# Patient Record
Sex: FEMALE | Race: WHITE | NOT HISPANIC OR LATINO | URBAN - METROPOLITAN AREA
[De-identification: names, ages, dates, MRNs, and addresses within clinical notes are randomized per-mention and may not be internally consistent; named-entity substitution may affect disease eponyms.]

---

## 2018-01-14 ENCOUNTER — HISTORICAL (OUTPATIENT)
Dept: ADMINISTRATIVE | Facility: HOSPITAL | Age: 58
End: 2018-01-14

## 2018-01-18 ENCOUNTER — HISTORICAL (OUTPATIENT)
Dept: LAB | Facility: HOSPITAL | Age: 58
End: 2018-01-18

## 2018-01-18 LAB
APTT PPP: 25.8 SECOND(S) (ref 24.8–36.9)
INR PPP: 0.93 (ref 0–1.27)
PROTHROMBIN TIME: 12.8 SECOND(S) (ref 12.2–14.7)

## 2018-01-20 LAB
FINAL CULTURE: NORMAL
FINAL CULTURE: NORMAL

## 2018-02-11 ENCOUNTER — HISTORICAL (OUTPATIENT)
Dept: ADMINISTRATIVE | Facility: HOSPITAL | Age: 58
End: 2018-02-11

## 2018-02-13 LAB — FINAL CULTURE: NORMAL

## 2018-02-17 LAB
FINAL CULTURE: NORMAL
FINAL CULTURE: NORMAL

## 2019-09-26 ENCOUNTER — HISTORICAL (OUTPATIENT)
Dept: ADMINISTRATIVE | Facility: HOSPITAL | Age: 59
End: 2019-09-26

## 2019-09-28 LAB — FINAL CULTURE: NORMAL

## 2022-04-10 ENCOUNTER — HISTORICAL (OUTPATIENT)
Dept: ADMINISTRATIVE | Facility: HOSPITAL | Age: 62
End: 2022-04-10

## 2022-04-29 VITALS
BODY MASS INDEX: 29.06 KG/M2 | SYSTOLIC BLOOD PRESSURE: 124 MMHG | WEIGHT: 170.19 LBS | DIASTOLIC BLOOD PRESSURE: 82 MMHG | HEIGHT: 64 IN | OXYGEN SATURATION: 98 %

## 2022-09-07 DIAGNOSIS — N64.4 BREAST PAIN, RIGHT: ICD-10-CM

## 2022-09-07 DIAGNOSIS — R92.8 ABNORMAL MAMMOGRAM: ICD-10-CM

## 2022-09-07 DIAGNOSIS — N89.8 VAGINAL LESION: Primary | ICD-10-CM

## 2022-12-12 ENCOUNTER — OFFICE VISIT (OUTPATIENT)
Dept: GYNECOLOGY | Facility: CLINIC | Age: 62
End: 2022-12-12
Payer: MEDICAID

## 2022-12-12 VITALS
BODY MASS INDEX: 31.65 KG/M2 | SYSTOLIC BLOOD PRESSURE: 108 MMHG | HEIGHT: 63 IN | DIASTOLIC BLOOD PRESSURE: 73 MMHG | TEMPERATURE: 98 F | OXYGEN SATURATION: 100 % | WEIGHT: 178.63 LBS | HEART RATE: 67 BPM | RESPIRATION RATE: 18 BRPM

## 2022-12-12 DIAGNOSIS — N89.8 VAGINAL LESION: ICD-10-CM

## 2022-12-12 DIAGNOSIS — N64.4 BREAST PAIN, RIGHT: ICD-10-CM

## 2022-12-12 DIAGNOSIS — R92.8 ABNORMAL MAMMOGRAM: ICD-10-CM

## 2022-12-12 DIAGNOSIS — N89.8 VAGINAL MASS: Primary | ICD-10-CM

## 2022-12-12 DIAGNOSIS — R30.0 DYSURIA: ICD-10-CM

## 2022-12-12 DIAGNOSIS — Z12.4 SCREENING FOR CERVICAL CANCER: ICD-10-CM

## 2022-12-12 DIAGNOSIS — B37.31 VULVAR CANDIDIASIS: ICD-10-CM

## 2022-12-12 LAB
APPEARANCE UR: CLEAR
BACTERIA #/AREA URNS AUTO: ABNORMAL /HPF
BILIRUB UR QL STRIP.AUTO: NEGATIVE MG/DL
COLOR UR AUTO: ABNORMAL
GLUCOSE UR QL STRIP.AUTO: NORMAL MG/DL
HYALINE CASTS #/AREA URNS LPF: ABNORMAL /LPF
KETONES UR QL STRIP.AUTO: NEGATIVE MG/DL
LEUKOCYTE ESTERASE UR QL STRIP.AUTO: NEGATIVE UNIT/L
NITRITE UR QL STRIP.AUTO: NEGATIVE
PH UR STRIP.AUTO: 5 [PH]
PROT UR QL STRIP.AUTO: NEGATIVE MG/DL
RBC #/AREA URNS AUTO: ABNORMAL /HPF
RBC UR QL AUTO: ABNORMAL UNIT/L
SP GR UR STRIP.AUTO: 1.02
SQUAMOUS #/AREA URNS LPF: ABNORMAL /HPF
UROBILINOGEN UR STRIP-ACNC: NORMAL MG/DL
WBC #/AREA URNS AUTO: ABNORMAL /HPF

## 2022-12-12 PROCEDURE — 87591 N.GONORRHOEAE DNA AMP PROB: CPT

## 2022-12-12 PROCEDURE — 87624 HPV HI-RISK TYP POOLED RSLT: CPT

## 2022-12-12 PROCEDURE — 87661 TRICHOMONAS VAGINALIS AMPLIF: CPT

## 2022-12-12 PROCEDURE — 87088 URINE BACTERIA CULTURE: CPT

## 2022-12-12 PROCEDURE — 57100 BIOPSY VAGINAL MUCOSA SIMPLE: CPT | Mod: PBBFAC | Performed by: OBSTETRICS & GYNECOLOGY

## 2022-12-12 PROCEDURE — 87491 CHLMYD TRACH DNA AMP PROBE: CPT

## 2022-12-12 PROCEDURE — 88305 TISSUE EXAM BY PATHOLOGIST: CPT

## 2022-12-12 PROCEDURE — 81001 URINALYSIS AUTO W/SCOPE: CPT

## 2022-12-12 PROCEDURE — 87480 CANDIDA DNA DIR PROBE: CPT

## 2022-12-12 PROCEDURE — 99215 OFFICE O/P EST HI 40 MIN: CPT | Mod: PBBFAC,25

## 2022-12-12 RX ORDER — NYSTATIN AND TRIAMCINOLONE ACETONIDE 100000; 1 [USP'U]/G; MG/G
CREAM TOPICAL
COMMUNITY
Start: 2022-09-22

## 2022-12-12 RX ORDER — HYDROCODONE BITARTRATE AND ACETAMINOPHEN 5; 325 MG/1; MG/1
1 TABLET ORAL
COMMUNITY
Start: 2022-06-28

## 2022-12-12 RX ORDER — ALBUTEROL SULFATE 90 UG/1
2 AEROSOL, METERED RESPIRATORY (INHALATION) EVERY 6 HOURS
COMMUNITY
Start: 2022-09-21

## 2022-12-12 RX ORDER — CYCLOSPORINE 0.5 MG/ML
1 EMULSION OPHTHALMIC 2 TIMES DAILY
COMMUNITY
Start: 2022-10-24

## 2022-12-12 RX ORDER — FLUCONAZOLE 150 MG/1
150 TABLET ORAL
Qty: 3 TABLET | Refills: 1 | Status: SHIPPED | OUTPATIENT
Start: 2022-12-12 | End: 2022-12-17

## 2022-12-12 RX ORDER — FLUTICASONE PROPIONATE 50 MCG
1 SPRAY, SUSPENSION (ML) NASAL
COMMUNITY
Start: 2022-09-21

## 2022-12-12 RX ORDER — BUDESONIDE AND FORMOTEROL FUMARATE DIHYDRATE 160; 4.5 UG/1; UG/1
AEROSOL RESPIRATORY (INHALATION)
COMMUNITY
Start: 2022-09-21

## 2022-12-12 RX ORDER — ONDANSETRON 8 MG/1
TABLET, ORALLY DISINTEGRATING ORAL
COMMUNITY
Start: 2022-06-29

## 2022-12-12 RX ORDER — FUROSEMIDE 20 MG/1
20 TABLET ORAL DAILY PRN
COMMUNITY
Start: 2022-07-25

## 2022-12-12 RX ORDER — MUPIROCIN 20 MG/G
OINTMENT TOPICAL
COMMUNITY
Start: 2022-06-29

## 2022-12-12 RX ORDER — ATORVASTATIN CALCIUM 20 MG/1
20 TABLET, FILM COATED ORAL DAILY
COMMUNITY
Start: 2022-07-27

## 2022-12-12 RX ORDER — OMEPRAZOLE 40 MG/1
1 CAPSULE, DELAYED RELEASE ORAL DAILY
COMMUNITY
Start: 2022-06-29

## 2022-12-12 NOTE — PROGRESS NOTES
"[facility] NOTE  [facility]  [address] [place] [address]  Phone: [phone]  Fax: [phone]    Subjective:     Renay Kelly is a 62 y.o.  who presents complaining of 8 month h/o vaginal lesion vs mass that "hangs" with associated pain and pruritis. Occasionally noted vaginal spotting. Of note, pt is s/p hysterectomy in 2015 following abdominal trauma.     Reports 2-3 month h/o malodorous urine and dysuria. Increased urinary frequency. Has seen a Urologist in [place], [place] who prescribed her a trial of Oxybutynin 5mg. Pt took this regimen for ~1 month before stopping due to minimal response.     Of note, pt reports h/o right breast masses that are being followed by her PCP (Dr. Ponce in [place], [place]). Requires MMG & US q6 months per pt.     Allergies: Latex (Rubber)  OBHx: SVDx1, C/S x2  GynHx:   S/p hyst in    Reports h/o abnormal paps prior to hyst. No paps since hyst  Denies Hx of STIs     MedHx:   Past Medical History:   Diagnosis Date    Hypertension      SurgHx:   Past Surgical History:   Procedure Laterality Date    BACK SURGERY       SECTION      GALLBLADDER SURGERY      HYSTERECTOMY      left elbow surgery      left shoulder surgery      left wrist surgery      lefy knee surgery      removal of tumors      tonsils surgery         Medications:     Current Outpatient Medications:     albuterol (PROVENTIL/VENTOLIN HFA) 90 mcg/actuation inhaler, Inhale 2 puffs into the lungs every 6 (six) hours., Disp: , Rfl:     atorvastatin (LIPITOR) 20 MG tablet, Take 20 mg by mouth once daily., Disp: , Rfl:     budesonide-formoterol 160-4.5 mcg (SYMBICORT) 160-4.5 mcg/actuation HFAA, USE 2 PUFFS BY MOUTH TWICE A DAY, Disp: , Rfl:     fluticasone propionate (FLONASE) 50 mcg/actuation nasal spray, 1 spray., Disp: , Rfl:     furosemide (LASIX) 20 MG tablet, Take 20 mg by mouth daily as needed., Disp: , Rfl:     HYDROcodone-acetaminophen (NORCO) 5-325 mg per tablet, Take 1 tablet by mouth., " "Disp: , Rfl:     mupirocin (BACTROBAN) 2 % ointment, APPLY TO AFFECTED AREA THREE TIMES A DAY FOR 3 DAYS, Disp: , Rfl:     nystatin-triamcinolone (MYCOLOG II) cream, APPLY A DIME-SIZED AMOUNT TO VAGINA EVERY DAY TO VAGINA AS DIRECTED, Disp: , Rfl:     omeprazole (PRILOSEC) 40 MG capsule, Take 1 capsule by mouth once daily., Disp: , Rfl:     ondansetron (ZOFRAN-ODT) 8 MG TbDL, DISSOLVE ONE TABLET IN MOUTH EVERY 8 HOURS FOR 3 DAYS, Disp: , Rfl:     fluconazole (DIFLUCAN) 150 MG Tab, Take 1 tablet (150 mg total) by mouth every 48 hours. for 3 doses, Disp: 3 tablet, Rfl: 1    RESTASIS 0.05 % ophthalmic emulsion, Place 1 drop into both eyes 2 (two) times daily., Disp: , Rfl:     FM Hx:   Family History   Problem Relation Age of Onset    Stroke Father     Hypertension Mother        Social Hx:   Social History     Socioeconomic History    Marital status:    Tobacco Use    Smoking status: Never     Passive exposure: Never    Smokeless tobacco: Never   Substance and Sexual Activity    Alcohol use: Yes     Comment: occasionally    Drug use: Never    Sexual activity: Not Currently       Review of Systems  Denies fevers, chills, headache, blurry vision, nausea, vomiting, dizziness, or syncope.   Denies chest pain, shortness of breath, RUQ pain, or calf pain.    Objective:     Vitals:    12/12/22 1009   BP: 108/73   BP Location: Left arm   Patient Position: Sitting   BP Method: Small (Automatic)   Pulse: 67   Resp: 18   Temp: 97.7 °F (36.5 °C)   TempSrc: Oral   SpO2: 100%   Weight: 81 kg (178 lb 9.6 oz)   Height: 5' 3" (1.6 m)     Body mass index is 31.64 kg/m².    Physical Exam:     General: alert and oriented, in no acute distress  Lungs: clear to auscultation bilaterally, no conversational dyspnea  Heart: regular rate and rhythm  Abdomen: Soft, non-distended, non tender to palpation, no involuntary guarding, no rebound tenderness  Extremities: Normal, atraumatic, non-edematous, No cords or calf tenderness, No " significant calf/ankle edema  External genitalia: Erythematous fungal infection noted on mons pubis and bilateral intertriginous area. Otherwise female genitalia without lesion, discharge or tenderness. Normal appearing urethral meatus. Normal appearing external anus.  Bimanual Exam: No pelvic lymphadenopathy noted bilaterally. ~1cm pedunculated pale, soft, wart-like vaginal mass noted at 12'o clock on the anterior vaginal wall. <1cm stalk/base, slightly firm. Non-friable. Mass painful to touch. >1cm from urethral opening. No adnexal fullness/tenderness. Normal urethra. Normal bladder. Vaginal cuff intact.  Speculum Exam: Atrophic, pale vagina. Vaginal cuff intact. See image below for mass.        Note: RN chaperone present for entirety of exam.    Procedure Note:  Verbal and written consents were completed. A time out was called. The site of excision was visualized and cleaned with Betadine x3. 10mL of buffered Lidocaine injected. Specimen grasped and sharply excised. Specimen sent to pathology. Site of incision was hemostatic following application of silver nitrate. Pt tolerated the procedure well.    Assessment/Plan:    Renay Kelly is a 62 y.o.  with vaginal mass and vulvar candidiasis     Vaginal Mass  S/p excision of 1-2cm mass. Sent to pathology  Vulvar candidiasis  Noted on exam  Will treat with Diflucan 150mg q48hrs x 3 doses  Vaginal Atrophy  Will consider initiation of Premarin following review of vaginal mass pathology. If not a malignancy, can initiate regimen  H/o cervical dysplasia  S/p hyst with reported h/o abnormal paps and no paps following hyst in   Pap completed today  Urinary frequency w/ dysuria  UA & UCx collectedUrge Incontinence  Encouraged to continue medical regimen for at least 3 months  Follows w/ Urologist in [facility] f/u in 2-3 weeks for results. Will call pt earlier if concern for malignancy.   Future Appointments   Date Time Provider Department Center    1/13/2023  7:45 AM [facility]     Patient and plan were discussed with Dr. Sweeney.    Rashad Drummond MD, MPH  [facility] -  PGY4  Pager: [phone]  10:30 AM 12/12/2022

## 2022-12-13 LAB
ESTROGEN SERPL-MCNC: NORMAL PG/ML
INSULIN SERPL-ACNC: NORMAL U[IU]/ML
LAB AP CLINICAL INFORMATION: NORMAL
LAB AP GROSS DESCRIPTION: NORMAL
LAB AP REPORT FOOTNOTES: NORMAL
T3RU NFR SERPL: NORMAL %

## 2022-12-14 LAB — BACTERIA UR CULT: NO GROWTH

## 2022-12-20 LAB
CANDIDA SPECIES (PRECISION): NEGATIVE
CHLAMYDIA TRACHOMATIS (PRECISION): NEGATIVE
HPV16+18+H RISK 12 DNA CVX-IMP: NEGATIVE
INSULIN SERPL-ACNC: NORMAL U[IU]/ML
LAB AP BETHESDA CATEGORY: NORMAL
LAB AP CLINICAL FINDINGS: NORMAL
LAB AP CONTRACEPTIVES: NORMAL
LAB AP GYN MOLECULAR TESTING: NORMAL
LAB AP LMP DATE: NORMAL
LAB AP OCHS PAP SPECIMEN ADEQUACY: NORMAL
LAB AP OHS PAP INTERPRETATION: NORMAL
LAB AP PAP DISCLAIMER COMMENTS: NORMAL
LAB AP PAP PREVIOUS BX: NORMAL
LAB AP PAP PRIOR TREATMENT: NORMAL
NEISSERIA GONORRHOEAE (PRECISION): NEGATIVE
TRICHOMONAS VAGINALIS (PRECISION): NEGATIVE

## 2023-01-13 ENCOUNTER — CLINICAL SUPPORT (OUTPATIENT)
Dept: GYNECOLOGY | Facility: CLINIC | Age: 63
End: 2023-01-13
Payer: MEDICAID

## 2023-01-13 DIAGNOSIS — A63.0 CONDYLOMA ACUMINATUM: ICD-10-CM

## 2023-01-13 DIAGNOSIS — N95.2 VAGINAL ATROPHY: ICD-10-CM

## 2023-01-13 RX ORDER — ESCITALOPRAM OXALATE 5 MG/1
1 TABLET ORAL
COMMUNITY

## 2023-01-13 RX ORDER — PROMETHAZINE HYDROCHLORIDE AND DEXTROMETHORPHAN HYDROBROMIDE 6.25; 15 MG/5ML; MG/5ML
SYRUP ORAL
COMMUNITY
Start: 2023-01-10

## 2023-01-13 RX ORDER — CETIRIZINE HYDROCHLORIDE 10 MG/1
1 TABLET ORAL
COMMUNITY
Start: 2023-01-10

## 2023-01-13 RX ORDER — AMOXICILLIN 500 MG/1
1 CAPSULE ORAL
COMMUNITY
Start: 2023-01-10

## 2023-01-13 NOTE — ASSESSMENT & PLAN NOTE
-Pathology results reviewed and discussed with patient  -Condyloma removed in entirety during prior clinical assessment

## 2023-01-13 NOTE — PROGRESS NOTES
[facility] OB/GYN CLINIC NOTE  [facility]  [address] [place] [address]  Phone: [phone]  Fax: [phone]    [facility] Women's Health Clinic Progress Note    Primary Site: [facility]  Patient location: Home  Physician location: In office    Chief Complaint: Follow up of vaginal mass pathology results    HPI  Renay Kelly is a 61yo  who joined me via our telemedicine platform to discuss pathology results.     Patient with <2cm warty appearing mass on anterior vaginal wall that was excised and sent to pathology. Reported some vaginal spotting for 4 days after the procedure but denies any currently episodes of bleeding. Reporting some vaginal dryness that has been present but otherwise no other acute complaints. Denies any fevers, chills, chest pain, shortness of breath. Normal bowel or bladder habits.     I have reviewed family history, social history, medications and allergies as documented in the patient's electronic medical record.  Reports, labs, outside records, and images have been reviewed with pertinent interpretations below. See telemedicine notes records for intervening communications.    Assessment/Plan  Problem List Items Addressed This Visit          Renal/    Vaginal atrophy     -Premarin cream prescribed and instructions for use reviewed.             ID    Condyloma acuminatum     -Pathology results reviewed and discussed with patient  -Condyloma removed in entirety during prior clinical assessment          See correspondence above for plan.   Patient's needs assessed and health education provided. Patient understands risks, benefits, and alternatives of treatment prescribed above. Patient verbalizes understanding and agrees to follow plan.    Patient's identity was confirmed and confidentiality/privacy confirmed prior to visit. I certify that this visit was done via secure two-way transmission with informed consent of the patient and/or guardian.  Each patient to whom I provide medical services by  telemedicine is:  (1) informed of the relationship between the physician and patient and the respective role of any other health care provider with respect to management of the patient; and (2) notified that they may decline to receive medical services by telemedicine and may withdraw from such care at any time. Patient verbally consented to receive this service via voice-only telephone call.    Audio only was selected because there was no capability for video conferencing with patient.  Patient and plan discussed with Dr. Sweeney.  RTC in 6 months.     Huang De Los Santos MD  [facility], PGY-4

## 2023-07-18 DIAGNOSIS — M79.672 LEFT FOOT PAIN: Primary | ICD-10-CM

## 2023-07-24 ENCOUNTER — OFFICE VISIT (OUTPATIENT)
Dept: ORTHOPEDICS | Facility: CLINIC | Age: 63
End: 2023-07-24
Payer: MEDICAID

## 2023-07-24 ENCOUNTER — HOSPITAL ENCOUNTER (OUTPATIENT)
Dept: RADIOLOGY | Facility: HOSPITAL | Age: 63
Discharge: HOME | End: 2023-07-24
Attending: STUDENT IN AN ORGANIZED HEALTH CARE EDUCATION/TRAINING PROGRAM
Payer: MEDICAID

## 2023-07-24 VITALS
WEIGHT: 178 LBS | SYSTOLIC BLOOD PRESSURE: 121 MMHG | OXYGEN SATURATION: 96 % | DIASTOLIC BLOOD PRESSURE: 69 MMHG | RESPIRATION RATE: 20 BRPM | BODY MASS INDEX: 31.54 KG/M2 | HEART RATE: 78 BPM | HEIGHT: 63 IN

## 2023-07-24 DIAGNOSIS — M79.672 LEFT FOOT PAIN: ICD-10-CM

## 2023-07-24 PROCEDURE — 73630 X-RAY EXAM OF FOOT: CPT | Mod: TC,LT

## 2023-07-24 PROCEDURE — 1159F PR MEDICATION LIST DOCUMENTED IN MEDICAL RECORD: ICD-10-PCS | Mod: CPTII,,, | Performed by: SPECIALIST

## 2023-07-24 PROCEDURE — 99215 OFFICE O/P EST HI 40 MIN: CPT | Mod: PBBFAC

## 2023-07-24 PROCEDURE — 3008F BODY MASS INDEX DOCD: CPT | Mod: CPTII,,, | Performed by: SPECIALIST

## 2023-07-24 PROCEDURE — 3008F PR BODY MASS INDEX (BMI) DOCUMENTED: ICD-10-PCS | Mod: CPTII,,, | Performed by: SPECIALIST

## 2023-07-24 PROCEDURE — 3074F SYST BP LT 130 MM HG: CPT | Mod: CPTII,,, | Performed by: SPECIALIST

## 2023-07-24 PROCEDURE — 3078F PR MOST RECENT DIASTOLIC BLOOD PRESSURE < 80 MM HG: ICD-10-PCS | Mod: CPTII,,, | Performed by: SPECIALIST

## 2023-07-24 PROCEDURE — 99499 UNLISTED E&M SERVICE: CPT | Mod: S$PBB,,, | Performed by: SPECIALIST

## 2023-07-24 PROCEDURE — 3078F DIAST BP <80 MM HG: CPT | Mod: CPTII,,, | Performed by: SPECIALIST

## 2023-07-24 PROCEDURE — 99499 NO LOS: ICD-10-PCS | Mod: S$PBB,,, | Performed by: SPECIALIST

## 2023-07-24 PROCEDURE — 3074F PR MOST RECENT SYSTOLIC BLOOD PRESSURE < 130 MM HG: ICD-10-PCS | Mod: CPTII,,, | Performed by: SPECIALIST

## 2023-07-24 PROCEDURE — 1159F MED LIST DOCD IN RCRD: CPT | Mod: CPTII,,, | Performed by: SPECIALIST

## 2023-07-24 NOTE — PROGRESS NOTES
[facility]  New Patient Office Visit  2023       Patient ID: Renay Kelly  YOB: 1960  MRN: [id number]        Chief Complaint: Injury and Pain of the Left Foot      HPI:Renay Kelly is a 62 y.o. female who presents for initial evaluation of a left 4th toe proximal phalanx fracture sustained 2 weeks ago.  Patient reports she was walking over an object when she tripped.  Her left 4th toe.  She reports that there was no blood or open wound at the time of injury.  She went to emergency room where she was given a cam boot.  She has been ambulatory in a cam boot.  She reports longstanding history of hammertoe deformities.  She has significant swelling over the dorsum of her foot.  She works as a teacher.    She does not smoke cigarettes.  She does not drink alcohol.  She does not use illicit drugs.  She has past medical history of asthma, brain tumor, heart disease.  She had a previous hysterectomy, cholecystectomy, C-sections, left elbow surgery, left meniscal surgery.    Past Medical History:    Past Medical History:   Diagnosis Date    Hypertension      Past Surgical History:   Procedure Laterality Date    BACK SURGERY       SECTION      GALLBLADDER SURGERY      HYSTERECTOMY      left elbow surgery      left shoulder surgery      left wrist surgery      lefy knee surgery      removal of tumors      tonsils surgery       Family History   Problem Relation Age of Onset    Stroke Father     Hypertension Mother      Social History     Socioeconomic History    Marital status:    Tobacco Use    Smoking status: Never     Passive exposure: Never    Smokeless tobacco: Never   Substance and Sexual Activity    Alcohol use: Never     Comment: occasionally    Drug use: Never    Sexual activity: Not Currently     Medication List with Changes/Refills   Current Medications    ALBUTEROL (PROVENTIL/VENTOLIN HFA) 90 MCG/ACTUATION INHALER    Inhale 2 puffs into the lungs  every 6 (six) hours.    AMOXICILLIN (AMOXIL) 500 MG CAPSULE    1 capsule.    ATORVASTATIN (LIPITOR) 20 MG TABLET    Take 20 mg by mouth once daily.    BUDESONIDE-FORMOTEROL 160-4.5 MCG (SYMBICORT) 160-4.5 MCG/ACTUATION HFAA    USE 2 PUFFS BY MOUTH TWICE A DAY    CETIRIZINE (ZYRTEC) 10 MG TABLET    1 tablet.    CONJUGATED ESTROGENS (PREMARIN) VAGINAL CREAM    Place 0.5 g vaginally once daily.    ESCITALOPRAM OXALATE (LEXAPRO) 5 MG TAB    1 tablet.    FLUTICASONE PROPIONATE (FLONASE) 50 MCG/ACTUATION NASAL SPRAY    1 spray.    FUROSEMIDE (LASIX) 20 MG TABLET    Take 20 mg by mouth daily as needed.    HYDROCODONE-ACETAMINOPHEN (NORCO) 5-325 MG PER TABLET    Take 1 tablet by mouth.    MUPIROCIN (BACTROBAN) 2 % OINTMENT    APPLY TO AFFECTED AREA THREE TIMES A DAY FOR 3 DAYS    NYSTATIN-TRIAMCINOLONE (MYCOLOG II) CREAM    APPLY A DIME-SIZED AMOUNT TO VAGINA EVERY DAY TO VAGINA AS DIRECTED    OMEPRAZOLE (PRILOSEC) 40 MG CAPSULE    Take 1 capsule by mouth once daily.    ONDANSETRON (ZOFRAN-ODT) 8 MG TBDL    DISSOLVE ONE TABLET IN MOUTH EVERY 8 HOURS FOR 3 DAYS    PROMETHAZINE-DEXTROMETHORPHAN (PROMETHAZINE-DM) 6.25-15 MG/5 ML SYRP    5 mL as needed    RESTASIS 0.05 % OPHTHALMIC EMULSION    Place 1 drop into both eyes 2 (two) times daily.     Review of patient's allergies indicates:   Allergen Reactions    Latex, natural rubber Rash       Physical Exam:    Left Lower extremity:  Significant swelling over the dorsum of the foot.  No open wounds or abrasions.  No ecchymosis  Isolated TTP over the 4th toe proximal phalanx.  No TTP over the Lisfranc interval.  No TTP over the medial/lateral malleoli  Motor intact: ehl/fhl/ta/gsc  SILT: dp/sp/t/helton/sa  Full ROM of hip, knee, ankle  2+ dp pulse    Imaging:  XR left foot:  Oblique fracture of the 4th toe proximal phalanx.  Toe in maintained alignment.  No other fractures noted.  Hammertoe deformities of the 2nd through 5th toes.  No widening of the Lisfranc interval.    Assessment  and Plan:    Renay Kelly is a 62 y.o. female with a left 4th toe proximal phalanx fracture sustained around approximately 07/10/2023.    - hard sole shoe  - WBAT  - rest, ice, compression, elevation  - follow up in 6 weeks if continued pain or if any issues    Rey Lowe MD  [facility] PGY-3        Orders Placed This Encounter    X-Ray Foot Complete Left

## 2023-07-25 NOTE — PROGRESS NOTES
Faculty Attestation: Renay Kelly  was seen at [facility] in the Orthopaedic Clinic. Discussed with the resident at the time of the visit. History of Present Illness, Physical Exam, and Assessment and Plan reviewed. Treatment plan is reasonable and appropriate. Compliance with treatment recommendations is important. Discussed with the resident at the time of the visit.  No procedure was performed.     Yong Avalos MD MD  Orthopaedic Surgery